# Patient Record
Sex: FEMALE | Race: WHITE
[De-identification: names, ages, dates, MRNs, and addresses within clinical notes are randomized per-mention and may not be internally consistent; named-entity substitution may affect disease eponyms.]

---

## 2018-04-13 ENCOUNTER — HOSPITAL ENCOUNTER (EMERGENCY)
Dept: HOSPITAL 62 - ER | Age: 23
Discharge: HOME | End: 2018-04-13
Payer: COMMERCIAL

## 2018-04-13 VITALS — DIASTOLIC BLOOD PRESSURE: 64 MMHG | SYSTOLIC BLOOD PRESSURE: 105 MMHG

## 2018-04-13 DIAGNOSIS — Z98.890: ICD-10-CM

## 2018-04-13 DIAGNOSIS — R09.89: ICD-10-CM

## 2018-04-13 DIAGNOSIS — Z32.02: ICD-10-CM

## 2018-04-13 DIAGNOSIS — Z87.42: ICD-10-CM

## 2018-04-13 DIAGNOSIS — R10.812: ICD-10-CM

## 2018-04-13 DIAGNOSIS — R11.2: ICD-10-CM

## 2018-04-13 LAB
ADD MANUAL DIFF: NO
ALBUMIN SERPL-MCNC: 4.3 G/DL (ref 3.5–5)
ALP SERPL-CCNC: 77 U/L (ref 38–126)
ALT SERPL-CCNC: 43 U/L (ref 9–52)
ANION GAP SERPL CALC-SCNC: 13 MMOL/L (ref 5–19)
APPEARANCE UR: (no result)
APTT PPP: YELLOW S
AST SERPL-CCNC: 34 U/L (ref 14–36)
BASOPHILS # BLD AUTO: 0 10^3/UL (ref 0–0.2)
BASOPHILS NFR BLD AUTO: 0.2 % (ref 0–2)
BILIRUB DIRECT SERPL-MCNC: 0.3 MG/DL (ref 0–0.4)
BILIRUB SERPL-MCNC: 0.5 MG/DL (ref 0.2–1.3)
BILIRUB UR QL STRIP: NEGATIVE
BUN SERPL-MCNC: 15 MG/DL (ref 7–20)
CALCIUM: 9.7 MG/DL (ref 8.4–10.2)
CHLORIDE SERPL-SCNC: 103 MMOL/L (ref 98–107)
CO2 SERPL-SCNC: 25 MMOL/L (ref 22–30)
EOSINOPHIL # BLD AUTO: 0 10^3/UL (ref 0–0.6)
EOSINOPHIL NFR BLD AUTO: 0.3 % (ref 0–6)
ERYTHROCYTE [DISTWIDTH] IN BLOOD BY AUTOMATED COUNT: 13.3 % (ref 11.5–14)
GLUCOSE SERPL-MCNC: 102 MG/DL (ref 75–110)
GLUCOSE UR STRIP-MCNC: NEGATIVE MG/DL
HCT VFR BLD CALC: 37.9 % (ref 36–47)
HGB BLD-MCNC: 12.6 G/DL (ref 12–15.5)
KETONES UR STRIP-MCNC: 20 MG/DL
LIPASE SERPL-CCNC: 71.1 U/L (ref 23–300)
LYMPHOCYTES # BLD AUTO: 0.7 10^3/UL (ref 0.5–4.7)
LYMPHOCYTES NFR BLD AUTO: 6.3 % (ref 13–45)
MCH RBC QN AUTO: 29.4 PG (ref 27–33.4)
MCHC RBC AUTO-ENTMCNC: 33.3 G/DL (ref 32–36)
MCV RBC AUTO: 88 FL (ref 80–97)
MONOCYTES # BLD AUTO: 0.8 10^3/UL (ref 0.1–1.4)
MONOCYTES NFR BLD AUTO: 7.2 % (ref 3–13)
NEUTROPHILS # BLD AUTO: 9.9 10^3/UL (ref 1.7–8.2)
NEUTS SEG NFR BLD AUTO: 86 % (ref 42–78)
NITRITE UR QL STRIP: NEGATIVE
PH UR STRIP: 7 [PH] (ref 5–9)
PLATELET # BLD: 231 10^3/UL (ref 150–450)
POTASSIUM SERPL-SCNC: 4.1 MMOL/L (ref 3.6–5)
PROT SERPL-MCNC: 6.8 G/DL (ref 6.3–8.2)
PROT UR STRIP-MCNC: 30 MG/DL
RBC # BLD AUTO: 4.3 10^6/UL (ref 3.72–5.28)
SODIUM SERPL-SCNC: 140.8 MMOL/L (ref 137–145)
SP GR UR STRIP: 1.02
TOTAL CELLS COUNTED % (AUTO): 100 %
UROBILINOGEN UR-MCNC: NEGATIVE MG/DL (ref ?–2)
WBC # BLD AUTO: 11.6 10^3/UL (ref 4–10.5)

## 2018-04-13 PROCEDURE — 87186 SC STD MICRODIL/AGAR DIL: CPT

## 2018-04-13 PROCEDURE — 84703 CHORIONIC GONADOTROPIN ASSAY: CPT

## 2018-04-13 PROCEDURE — 87088 URINE BACTERIA CULTURE: CPT

## 2018-04-13 PROCEDURE — 71101 X-RAY EXAM UNILAT RIBS/CHEST: CPT

## 2018-04-13 PROCEDURE — 85025 COMPLETE CBC W/AUTO DIFF WBC: CPT

## 2018-04-13 PROCEDURE — S0119 ONDANSETRON 4 MG: HCPCS

## 2018-04-13 PROCEDURE — 81001 URINALYSIS AUTO W/SCOPE: CPT

## 2018-04-13 PROCEDURE — 83690 ASSAY OF LIPASE: CPT

## 2018-04-13 PROCEDURE — 87086 URINE CULTURE/COLONY COUNT: CPT

## 2018-04-13 PROCEDURE — 80053 COMPREHEN METABOLIC PANEL: CPT

## 2018-04-13 PROCEDURE — 96372 THER/PROPH/DIAG INJ SC/IM: CPT

## 2018-04-13 PROCEDURE — 99284 EMERGENCY DEPT VISIT MOD MDM: CPT

## 2018-04-13 PROCEDURE — 36415 COLL VENOUS BLD VENIPUNCTURE: CPT

## 2018-04-13 NOTE — ER DOCUMENT REPORT
ED Medical Screen (RME)





- General


Chief Complaint: Flank Pain


Stated Complaint: FLANK PAIN


Time Seen by Provider: 04/13/18 17:03


Notes: 





Sudden onset left flank pain wrapping around to the front and down toward the 

labia.  Patient unable to sit still.  Quite nauseous and vomiting.  No history 

of kidney stones.


Patient delivered 6 weeks ago, started her first.  In the past few days since 

delivery.


TRAVEL OUTSIDE OF THE [place] IN LAST 30 DAYS: No





- Related Data


Allergies/Adverse Reactions: 


 





morphine [Morphine] Allergy (Verified 03/18/15 00:33)


 











Past Medical History


Renal/ Medical History: Reports: Hx Ovarian Cysts


Past Surgical History: Reports: Hx Gynecologic Surgery - Lap X 5, Hx Oral 

Surgery





Physical Exam





- Vital signs


Vitals: 





 











Temp Pulse Resp BP Pulse Ox


 


 97.9 F   72   18   126/97 H  99 


 


 04/13/18 16:37  04/13/18 16:37  04/13/18 16:37  04/13/18 16:37  04/13/18 16:37














Course





- Vital Signs


Vital signs: 





 











Temp Pulse Resp BP Pulse Ox


 


 97.9 F   72   18   126/97 H  99 


 


 04/13/18 16:37  04/13/18 16:37  04/13/18 16:37  04/13/18 16:37  04/13/18 16:37

## 2018-04-13 NOTE — ER DOCUMENT REPORT
ED General





- General


Chief Complaint: Flank Pain


Stated Complaint: FLANK PAIN


Time Seen by Provider: 04/13/18 17:03


Mode of Arrival: Ambulatory


Information source: Patient


Notes: 





24 yo female with sudden onset while sitting of severe sharp left lower chest 

pain, LUQ abd. pain and lateral  chest wall pain. No fever. Caused nauseA. No v/

d.  No SOB. Body building on tuesday, ran wednesday, nothing today or 

yesterday. Breastfeeding infant.


TRAVEL OUTSIDE OF THE [place] IN LAST 30 DAYS: No





- Related Data


Allergies/Adverse Reactions: 


 





morphine [Morphine] Allergy (Verified 03/18/15 00:33)


 











Past Medical History





- General


Information source: Patient





- Social History


Smoking Status: Never Smoker


Chew tobacco use (# tins/day): No


Frequency of alcohol use: None


Drug Abuse: None


Lives with: Spouse/Significant other


Family History: Reviewed & Not Pertinent


Patient has suicidal ideation: No


Patient has homicidal ideation: No


Renal/ Medical History: Reports: Hx Ovarian Cysts.  Denies: Hx Peritoneal 

Dialysis


Past Surgical History: Reports: Hx Gynecologic Surgery - Lap X 5, Hx Oral 

Surgery





Review of Systems





- Review of Systems


Constitutional: No symptoms reported


EENT: No symptoms reported


Cardiovascular: No symptoms reported


Respiratory: See HPI


Gastrointestinal: No symptoms reported


Genitourinary: No symptoms reported


Female Genitourinary: No symptoms reported


Musculoskeletal: No symptoms reported


Skin: No symptoms reported


Hematologic/Lymphatic: No symptoms reported


Neurological/Psychological: No symptoms reported





Physical Exam





- Vital signs


Vitals: 


 











Temp Pulse Resp BP Pulse Ox


 


 97.9 F   72   18   126/97 H  99 


 


 04/13/18 16:37  04/13/18 16:37  04/13/18 16:37  04/13/18 16:37  04/13/18 16:37











Interpretation: Normal





- General


General appearance: Appears well, Alert


In distress: None





- HEENT


Head: Normocephalic, Atraumatic


Eyes: Normal


Pupils: PERRL





- Respiratory


Respiratory status: No respiratory distress


Chest status: Tender - left lower medial sternal margin and the muscle 

insertion especially with sit up, also tender later lower chest wall


Breath sounds: Normal


Chest palpation: Normal





- Cardiovascular


Rhythm: Regular


Heart sounds: Normal auscultation


Murmur: No





- Abdominal


Inspection: Normal


Distension: No distension


Bowel sounds: Normal


Tenderness: Tender - see above


Organomegaly: No organomegaly





- Back


Back: Normal, Nontender.  No: CVA tenderness





- Extremities


General upper extremity: Normal inspection, Nontender, Normal color, Normal ROM

, Normal temperature


General lower extremity: Normal inspection, Nontender, Normal color, Normal ROM

, Normal temperature, Normal weight bearing.  No: Steven's sign





- Neurological


Neuro grossly intact: Yes


Cognition: Normal


Orientation: AAOx4


[place] Coma Scale Eye Opening: Spontaneous


[place] Coma Scale Verbal: Oriented


Brigitte Coma Scale Motor: Obeys Commands


Brigitte Coma Scale Total: 15


Speech: Normal


Motor strength normal: LUE, RUE, LLE, RLE


Sensory: Normal





- Psychological


Associated symptoms: Normal affect, Normal mood





- Skin


Skin Temperature: Warm


Skin Moisture: Dry


Skin Color: Normal


Skin irregularity: negative: Rash





Course





- Re-evaluation


Re-evalutation: 





04/13/18 18:59


RBCs in urine is due to patient being on her menses.  WBCs 19.  No bacteria.  

Urine culture has been added.  Patient again denies left flank pain.  The ribs 

and chest x-ray are negative.  Chemistry is normal.  Pregnancy test is 

negative.  White count is 11.6 and segs are 86%. 


04/13/18 19:02








- Vital Signs


Vital signs: 


 











Temp Pulse Resp BP Pulse Ox


 


 97.9 F   72   18   126/97 H  99 


 


 04/13/18 16:37  04/13/18 16:37  04/13/18 16:37  04/13/18 16:37  04/13/18 16:37














- Laboratory


Result Diagrams: 


 04/13/18 18:00





 04/13/18 18:00


Laboratory results interpreted by me: 


 











  04/13/18 04/13/18





  17:21 18:00


 


WBC   11.6 H


 


Seg Neutrophils %   86.0 H


 


Lymphocytes %   6.3 L


 


Absolute Neutrophils   9.9 H


 


Urine Protein  30 H 


 


Urine Ketones  20 H 


 


Urine Blood  LARGE H 


 


Ur Leukocyte Esterase  TRACE H 














Discharge





- Discharge


Clinical Impression: 


 Left lower chest wall tenderness, LUQ abdominal muscle tender





Condition: Good


Disposition: HOME, SELF-CARE


Instructions:  Acetaminophen, Warm Packs ([facility]), Muscle Strain ([facility]), Myalagia (

Muscle Pain) ([facility]), Chest Wall Pain ([facility])


Additional Instructions: 


Warm compress to sore areas


Tylenol


Return to the emergency room for worsening of the symptoms


See Dr. Nino for follow-up on Monday


Referrals: 


JAUN NINO MD [Primary Care Provider] - 04/16/18

## 2018-04-13 NOTE — RADIOLOGY REPORT (SQ)
EXAM DESCRIPTION:  RIBS LEFT W/PA CHEST



COMPLETED DATE/TIME:  4/13/2018 6:12 pm



REASON FOR STUDY:  left lower sternal margin pain



COMPARISON:  None.



TECHNIQUE:  Frontal view of the chest and additional views of the left ribs acquired.



NUMBER OF VIEWS:  Three views



LIMITATIONS:  None.



FINDINGS:  FRONTAL CXR: No pneumothorax.  No pleural effusion.  No atelectasis or infiltrates.

RIBS: No displaced rib fractures.  No lytic or blastic bony lesions.

OTHER: No other significant finding.



IMPRESSION:  NO PNEUMOTHORAX.  NO DISPLACED RIB FRACTURES.



COMMENT:  SITE OF TRAUMA/COMPLAINT MARKED/STAMP COMPLETED: Yes



TECHNICAL DOCUMENTATION:  JOB ID:  [id number]

 2011 [employer]- All Rights Reserved



Reading location - IP/workstation name: [email or web address]

## 2018-06-27 LAB
ALBUMIN SERPL-MCNC: 4.5 G/DL (ref 3.5–5)
ALP SERPL-CCNC: 71 U/L (ref 38–126)
ALT SERPL-CCNC: 30 U/L (ref 9–52)
ANION GAP SERPL CALC-SCNC: 14 MMOL/L (ref 5–19)
AST SERPL-CCNC: 22 U/L (ref 14–36)
BILIRUB DIRECT SERPL-MCNC: 0.3 MG/DL (ref 0–0.4)
BILIRUB SERPL-MCNC: 0.6 MG/DL (ref 0.2–1.3)
BUN SERPL-MCNC: 11 MG/DL (ref 7–20)
CALCIUM: 10.4 MG/DL (ref 8.4–10.2)
CHLORIDE SERPL-SCNC: 105 MMOL/L (ref 98–107)
CO2 SERPL-SCNC: 29 MMOL/L (ref 22–30)
ERYTHROCYTE [DISTWIDTH] IN BLOOD BY AUTOMATED COUNT: 12.4 % (ref 11.5–14)
GLUCOSE SERPL-MCNC: 79 MG/DL (ref 75–110)
HCT VFR BLD CALC: 40.9 % (ref 36–47)
HGB BLD-MCNC: 14 G/DL (ref 12–15.5)
MCH RBC QN AUTO: 29.8 PG (ref 27–33.4)
MCHC RBC AUTO-ENTMCNC: 34.3 G/DL (ref 32–36)
MCV RBC AUTO: 87 FL (ref 80–97)
PLATELET # BLD: 194 10^3/UL (ref 150–450)
POTASSIUM SERPL-SCNC: 4.2 MMOL/L (ref 3.6–5)
PROT SERPL-MCNC: 7.4 G/DL (ref 6.3–8.2)
RBC # BLD AUTO: 4.71 10^6/UL (ref 3.72–5.28)
SODIUM SERPL-SCNC: 147.5 MMOL/L (ref 137–145)
WBC # BLD AUTO: 4.5 10^3/UL (ref 4–10.5)

## 2018-06-29 ENCOUNTER — HOSPITAL ENCOUNTER (OUTPATIENT)
Dept: HOSPITAL 62 - OROUT | Age: 23
Discharge: HOME | End: 2018-06-29
Attending: SURGERY
Payer: COMMERCIAL

## 2018-06-29 VITALS — DIASTOLIC BLOOD PRESSURE: 60 MMHG | SYSTOLIC BLOOD PRESSURE: 111 MMHG

## 2018-06-29 DIAGNOSIS — Z88.5: ICD-10-CM

## 2018-06-29 DIAGNOSIS — K81.1: Primary | ICD-10-CM

## 2018-06-29 PROCEDURE — S0119 ONDANSETRON 4 MG: HCPCS

## 2018-06-29 PROCEDURE — 47562 LAPAROSCOPIC CHOLECYSTECTOMY: CPT

## 2018-06-29 PROCEDURE — 80076 HEPATIC FUNCTION PANEL: CPT

## 2018-06-29 PROCEDURE — 80048 BASIC METABOLIC PNL TOTAL CA: CPT

## 2018-06-29 PROCEDURE — 81025 URINE PREGNANCY TEST: CPT

## 2018-06-29 PROCEDURE — 88304 TISSUE EXAM BY PATHOLOGIST: CPT

## 2018-06-29 PROCEDURE — 85027 COMPLETE CBC AUTOMATED: CPT

## 2018-06-29 PROCEDURE — 36415 COLL VENOUS BLD VENIPUNCTURE: CPT

## 2018-06-29 RX ADMIN — FENTANYL CITRATE ONE MCG: 50 INJECTION INTRAMUSCULAR; INTRAVENOUS at 19:10

## 2018-06-29 RX ADMIN — FENTANYL CITRATE ONE MCG: 50 INJECTION INTRAMUSCULAR; INTRAVENOUS at 18:58

## 2018-06-29 RX ADMIN — FENTANYL CITRATE ONE MCG: 50 INJECTION INTRAMUSCULAR; INTRAVENOUS at 19:05

## 2018-06-29 RX ADMIN — MEPERIDINE HYDROCHLORIDE ONE MG: 25 INJECTION INTRAMUSCULAR; INTRAVENOUS; SUBCUTANEOUS at 18:50

## 2018-06-29 RX ADMIN — MEPERIDINE HYDROCHLORIDE ONE MG: 25 INJECTION INTRAMUSCULAR; INTRAVENOUS; SUBCUTANEOUS at 18:45

## 2018-06-29 NOTE — OPERATIVE REPORT
Nonrecallable Operative Report


DATE OF SURGERY: 06/29/18


PREOPERATIVE DIAGNOSIS: Symptomatic cholelithiasis


POSTOPERATIVE DIAGNOSIS: Chronic cholecystitis


OPERATION: Laparoscopic cholecystectomy


SURGEON: MARILU CASTAÑEDA


ANESTHESIA: GA


TISSUE REMOVED OR ALTERED: Gallbladder


COMPLICATIONS: 





None apparent


ESTIMATED BLOOD LOSS: Minimal


PROCEDURE: 





Procedure in detail: After informed consent was obtained, the patient was laid 

in the supine position in the operating room.  The abdomen was prepped and 

draped in a normal sterile fashion.  An infraumbilical incision was created 

with a 15 blade scalpel.  Dissection was carried through the subcutaneous 

tissue using blunt dissection.  The cicatrix was identified, grasped with a 

with a Kocher clamp, and retracted upwards.  The linea alba fascia was incised 

sharply, the abdomen was entered sharply.  The balloon trocar was inserted and 

pneumoperitoneum was achieved.





Next a 5 mm subxiphoid trocar was placed under direct laparoscopic 

visualization.  2 more 5 mm ports were placed in the right upper quadrant in 

similar fashion.  Atraumatic graspers were placed through the 5 mm ports.  The 

gallbladder was retracted cephalad and laterally.  There was an inflammatory 

reaction around the gallbladder that appeared chronic.  Dissection was carried 

out in the triangle of Calot.  The cystic duct and cystic artery were fully 

visualized and skeletonized, seeing the liver through the triangle.  Once the 

critical view of safety was obtained, the cystic duct and cystic artery were 

clipped and cut with laparoscopic instruments.  The gallbladder was then 

removed from the liver using Bovie electrocautery.  Once this was performed the 

gallbladder was grasped with a large clamp and pulled out through the 

umbilicus.  The hilum was then inspected.  It was found to be free of any 

leakage of blood or bile.  Once this was confirmed, the 5 mm trochars were 

removed under laparoscopic visualization.  The infraumbilical trocar was removed

, and pneumoperitoneum was relieved.





The infraumbilical fascia was closed using 0 Vicryl suture in figure-of-eight 

fashion.  The overlying skin was closed using 4-0 Vicryl Rapide suture in 

subcuticular fashion.  Dressings were placed, and the procedure was concluded.  

All sponge, instrument, and needle counts were correct 2.





Condition: Stable.

## 2018-06-29 NOTE — DISCHARGE SUMMARY
Discharge Summary (SDC)





- Discharge


Final Diagnosis: 





chronic cholecystitis


Date of Surgery: 06/29/18


Discharge Date: 06/29/18


Condition: Stable


Treatment or Instructions: 


Okay to shower on Sunday.  Wash incisions with soap and water.





Norco 10/325 mg p.o. every 6 hours as needed pain.





No tub baths or swimming 2 weeks.





No lifting greater than 10 pounds 2 weeks.


Referrals: 


JAUN NINO MD [Primary Care Provider] - 


Discharge Diet: As Tolerated


Respiratory Treatments at Home: Deep Breathing/Coughing, Incentive Spirometer


Discharge Activity: No Lifting Over 10 Pounds


Home Care Assistance: None Needed


Report the Following to Your Physician Immediately: Shortness of Breath, Nausea

, Vomiting, Increase in Pain, Yellow Skin, Fever over 101 Degrees, Unusual 

Bleeding, Swelling, Warmth